# Patient Record
Sex: MALE | URBAN - METROPOLITAN AREA
[De-identification: names, ages, dates, MRNs, and addresses within clinical notes are randomized per-mention and may not be internally consistent; named-entity substitution may affect disease eponyms.]

---

## 2021-04-05 ENCOUNTER — APPOINTMENT (RX ONLY)
Dept: URBAN - METROPOLITAN AREA CLINIC 23 | Facility: CLINIC | Age: 71
Setting detail: DERMATOLOGY
End: 2021-04-05

## 2021-04-05 VITALS — TEMPERATURE: 97.3 F

## 2021-04-05 DIAGNOSIS — Z41.9 ENCOUNTER FOR PROCEDURE FOR PURPOSES OTHER THAN REMEDYING HEALTH STATE, UNSPECIFIED: ICD-10-CM

## 2021-04-05 PROCEDURE — ? ADDITIONAL NOTES

## 2021-04-05 NOTE — PROCEDURE: ADDITIONAL NOTES
Additional Notes: Discussed with patient Botox wouldnât correct his area of concern.  \\nRecommended patient to see plastic surgeon for neck
Detail Level: Detailed
Render Risk Assessment In Note?: no